# Patient Record
Sex: FEMALE | Race: BLACK OR AFRICAN AMERICAN
[De-identification: names, ages, dates, MRNs, and addresses within clinical notes are randomized per-mention and may not be internally consistent; named-entity substitution may affect disease eponyms.]

---

## 2020-07-01 ENCOUNTER — HOSPITAL ENCOUNTER (OUTPATIENT)
Dept: HOSPITAL 53 - M LAB REF | Age: 30
End: 2020-07-01
Attending: PHYSICIAN ASSISTANT
Payer: COMMERCIAL

## 2020-07-01 DIAGNOSIS — N30.00: Primary | ICD-10-CM

## 2020-12-30 ENCOUNTER — HOSPITAL ENCOUNTER (OUTPATIENT)
Dept: HOSPITAL 53 - M SFHCWAGY | Age: 30
End: 2020-12-30
Attending: NURSE PRACTITIONER
Payer: OTHER GOVERNMENT

## 2020-12-30 DIAGNOSIS — Z11.3: Primary | ICD-10-CM

## 2020-12-30 LAB
CHLAMYDIA DNA AMPLIFICATION: NEGATIVE
N GONORRHOEA RRNA SPEC QL NAA+PROBE: NEGATIVE

## 2020-12-30 PROCEDURE — 87210 SMEAR WET MOUNT SALINE/INK: CPT

## 2020-12-30 PROCEDURE — 81025 URINE PREGNANCY TEST: CPT

## 2020-12-30 PROCEDURE — 87661 TRICHOMONAS VAGINALIS AMPLIF: CPT

## 2021-01-28 ENCOUNTER — HOSPITAL ENCOUNTER (OUTPATIENT)
Dept: HOSPITAL 53 - M PLALAB | Age: 31
End: 2021-01-28
Attending: ADVANCED PRACTICE MIDWIFE
Payer: COMMERCIAL

## 2021-01-28 DIAGNOSIS — Z3A.00: ICD-10-CM

## 2021-01-28 DIAGNOSIS — O10.919: Primary | ICD-10-CM

## 2021-01-28 LAB
ALT SERPL W P-5'-P-CCNC: 15 U/L (ref 12–78)
BILIRUB SERPL-MCNC: 0.4 MG/DL (ref 0.2–1)
CREAT SERPL-MCNC: 0.7 MG/DL (ref 0.55–1.3)
CREAT UR-MCNC: 71.1 MG/DL
GFR SERPL CREATININE-BSD FRML MDRD: > 60 ML/MIN/{1.73_M2} (ref 60–?)
HCT VFR BLD AUTO: 38.3 % (ref 36–47)
HCV AB SER QL: < 0 INDEX (ref ?–0.8)
HGB BLD-MCNC: 12.2 G/DL (ref 12–15.5)
HIV 1+2 AB+HIV1 P24 AG SERPL QL IA: NEGATIVE
LDH SERPL L TO P-CCNC: 155 U/L (ref 84–246)
MCH RBC QN AUTO: 28.2 PG (ref 27–33)
MCHC RBC AUTO-ENTMCNC: 31.9 G/DL (ref 32–36.5)
MCV RBC AUTO: 88.7 FL (ref 80–96)
PLATELET # BLD AUTO: 220 10^3/UL (ref 150–450)
PROT UR-MCNC: 11.8 MG/DL (ref 0–12)
RBC # BLD AUTO: 4.32 10^6/UL (ref 4–5.4)
URATE SERPL-MCNC: 2.4 MG/DL (ref 2.6–6)
WBC # BLD AUTO: 7.4 10^3/UL (ref 4–10)

## 2021-01-29 LAB
CHLAMYDIA DNA AMPLIFICATION: NEGATIVE
N GONORRHOEA RRNA SPEC QL NAA+PROBE: NEGATIVE

## 2021-03-26 ENCOUNTER — HOSPITAL ENCOUNTER (OUTPATIENT)
Dept: HOSPITAL 53 - M SFHCWAGY | Age: 31
End: 2021-03-26
Attending: ADVANCED PRACTICE MIDWIFE
Payer: OTHER GOVERNMENT

## 2021-03-26 DIAGNOSIS — O10.012: Primary | ICD-10-CM

## 2021-03-26 PROCEDURE — 87086 URINE CULTURE/COLONY COUNT: CPT

## 2021-04-15 ENCOUNTER — HOSPITAL ENCOUNTER (OUTPATIENT)
Dept: HOSPITAL 53 - M WHC | Age: 31
End: 2021-04-15
Attending: ADVANCED PRACTICE MIDWIFE
Payer: COMMERCIAL

## 2021-04-15 DIAGNOSIS — Z3A.20: ICD-10-CM

## 2021-04-15 NOTE — REP
INDICATION:

ANATOMY.



COMPARISON:

None.



TECHNIQUE:

Real-time sonographic evaluation of the gravid uterus performed.



FINDINGS:

Estimated gestational age is20 weeks 2 days, EDC 08/31/2021.  Today's measurements

indicate appropriate growth.



Presentation: Variable

Placenta posterior, grade 1, without evidence of placenta previa.

Fetal heart rate is recorded at 157 beats per minute.

Amniotic fluid is subjectively normal.

Closed cervical length is measured at 3.4 cm.



Biometry chart:



BPD: 45 mm, 19 weeks 4 days, 31st percentile.

HC: 172 mm, 19 weeks 5 days, 34th percentile

AC: 150 mm, 20 weeks 1 days, 48th percentile

Femur length: 33 mm, 20 weeks 3 days, 52nd percentile

HC to AC ratio: 1.15, normal range 1.06-1.24.



Estimated fetal weight: 340g, 41st percentile.



Fetal anatomy:



Cranium: Grossly normal

Lateral Ventricles/Choroid Plexus: Grossly normal

Posterior Fossa/Cerebellum: Grossly normal

Nose/lips/profile: Grossly normal

Four chamber heart: Grossly normal

Right ventricular outflow tract: Grossly normal

Left ventricular outflow tract: Grossly normal

Left-sided stomach: Grossly normal

Kidneys: Grossly normal

Bladder: Grossly normal

Cord Insertion: Grossly normal

3 vessel cord: Grossly normal

Spine: Grossly normal



Adjacent to the maternal right ovary is a small cystic structure measuring 2.7 x 1.6 x

1.9 cm.



IMPRESSION:

Viable single intrauterine gestation as above.





<Electronically signed by Samuel Bolivar > 04/15/21 [id number]

## 2021-06-10 ENCOUNTER — HOSPITAL ENCOUNTER (OUTPATIENT)
Dept: HOSPITAL 53 - M PLALAB | Age: 31
End: 2021-06-10
Attending: ADVANCED PRACTICE MIDWIFE
Payer: COMMERCIAL

## 2021-06-10 DIAGNOSIS — O10.012: Primary | ICD-10-CM

## 2021-06-10 LAB
HCT VFR BLD AUTO: 38.5 % (ref 36–47)
HGB BLD-MCNC: 12.4 G/DL (ref 12–15.5)
MCH RBC QN AUTO: 29.6 PG (ref 27–33)
MCHC RBC AUTO-ENTMCNC: 32.2 G/DL (ref 32–36.5)
MCV RBC AUTO: 91.9 FL (ref 80–96)
PLATELET # BLD AUTO: 204 10^3/UL (ref 150–450)
RBC # BLD AUTO: 4.19 10^6/UL (ref 4–5.4)
WBC # BLD AUTO: 6.7 10^3/UL (ref 4–10)

## 2021-07-07 ENCOUNTER — HOSPITAL ENCOUNTER (OUTPATIENT)
Dept: HOSPITAL 53 - M WHC | Age: 31
End: 2021-07-07
Attending: ADVANCED PRACTICE MIDWIFE
Payer: COMMERCIAL

## 2021-07-07 DIAGNOSIS — Z3A.32: ICD-10-CM

## 2021-07-07 DIAGNOSIS — O10.913: Primary | ICD-10-CM

## 2021-07-07 PROCEDURE — 76816 OB US FOLLOW-UP PER FETUS: CPT

## 2021-07-07 PROCEDURE — 59025 FETAL NON-STRESS TEST: CPT

## 2021-07-07 NOTE — REP
INDICATION:

CHRONIC HYPERTENSION,GROWTH



COMPARISON:

04/15/2021



TECHNIQUE:

Transabdominal obstetrical ultrasound with color Doppler evaluation.



FINDINGS:

Examination demonstrates a single live intrauterine pregnancy in cephalic

presentation.  Fetal motion is identified by technologist.  Placenta is noted

posterofundally and  grade 1 without evidence for placenta previa or abruption.

Amniotic fluid volume is subjectively low.  Cervix measures 3.5 cm in length and

appears closed.



Selected gestational age: 32 weeks 1 day with ARIELLE 08/31/2021.

Gestational age by current measurements 32 weeks 6 days with ARIELLE 08/26/2021.



FHR equals 132 beats per minute.



BPD:      8.3 cm at        33 weeks 2 days

HC:         30.0 cm at         33 weeks 1 day

AC:         28.5 cm at      32 weeks 3 days

FL:          6.1 cm at      31 weeks 5 days

HL:          5.8 cm at      33 weeks 2 days

HC/AC: 1.05

Estimated fetal weight 1965 grams (49thpercentile).



SOPHIA: 7.9 cm (8.6-24.2)

Umbilical artery SD ratio: 2.42, 2.60 (1.84-3.86)



IMPRESSION:

1. Single live intrauterine pregnancy in cephalic presentation demonstrating

appropriate interval growth.

2. Amniotic fluid volume is minimally below normal range.





<Electronically signed by Christiano Zepeda > 07/07/21 [id number]

## 2021-07-15 ENCOUNTER — HOSPITAL ENCOUNTER (OUTPATIENT)
Dept: HOSPITAL 53 - M PLALAB | Age: 31
End: 2021-07-15
Attending: ADVANCED PRACTICE MIDWIFE
Payer: COMMERCIAL

## 2021-07-15 DIAGNOSIS — O10.919: Primary | ICD-10-CM

## 2021-07-15 LAB
ALT SERPL W P-5'-P-CCNC: 30 U/L (ref 12–78)
BILIRUB SERPL-MCNC: 0.3 MG/DL (ref 0.2–1)
CREAT SERPL-MCNC: 0.67 MG/DL (ref 0.55–1.3)
CREAT UR-MCNC: 168 MG/DL
GFR SERPL CREATININE-BSD FRML MDRD: > 60 ML/MIN/{1.73_M2} (ref 60–?)
HCT VFR BLD AUTO: 36.4 % (ref 36–47)
HGB BLD-MCNC: 11.8 G/DL (ref 12–15.5)
LDH SERPL L TO P-CCNC: 144 U/L (ref 84–246)
MCH RBC QN AUTO: 29.4 PG (ref 27–33)
MCHC RBC AUTO-ENTMCNC: 32.4 G/DL (ref 32–36.5)
MCV RBC AUTO: 90.8 FL (ref 80–96)
PLATELET # BLD AUTO: 195 10^3/UL (ref 150–450)
PROT UR-MCNC: 24.1 MG/DL (ref 0–12)
RBC # BLD AUTO: 4.01 10^6/UL (ref 4–5.4)
URATE SERPL-MCNC: 3.3 MG/DL (ref 2.6–6)
WBC # BLD AUTO: 6.9 10^3/UL (ref 4–10)

## 2021-07-16 ENCOUNTER — HOSPITAL ENCOUNTER (OUTPATIENT)
Dept: HOSPITAL 53 - M LDO | Age: 31
LOS: 1 days | Discharge: HOME | End: 2021-07-17
Attending: ADVANCED PRACTICE MIDWIFE
Payer: COMMERCIAL

## 2021-07-16 VITALS — SYSTOLIC BLOOD PRESSURE: 133 MMHG | DIASTOLIC BLOOD PRESSURE: 86 MMHG

## 2021-07-16 VITALS — DIASTOLIC BLOOD PRESSURE: 83 MMHG | SYSTOLIC BLOOD PRESSURE: 135 MMHG

## 2021-07-16 VITALS — SYSTOLIC BLOOD PRESSURE: 142 MMHG | DIASTOLIC BLOOD PRESSURE: 91 MMHG

## 2021-07-16 VITALS — SYSTOLIC BLOOD PRESSURE: 131 MMHG | DIASTOLIC BLOOD PRESSURE: 88 MMHG

## 2021-07-16 VITALS — DIASTOLIC BLOOD PRESSURE: 93 MMHG | SYSTOLIC BLOOD PRESSURE: 130 MMHG

## 2021-07-16 VITALS — HEIGHT: 62 IN | WEIGHT: 172.4 LBS | BODY MASS INDEX: 31.73 KG/M2

## 2021-07-16 VITALS — DIASTOLIC BLOOD PRESSURE: 83 MMHG | SYSTOLIC BLOOD PRESSURE: 139 MMHG

## 2021-07-16 VITALS — SYSTOLIC BLOOD PRESSURE: 130 MMHG | DIASTOLIC BLOOD PRESSURE: 93 MMHG

## 2021-07-16 DIAGNOSIS — Z3A.33: ICD-10-CM

## 2021-07-16 DIAGNOSIS — F43.10: ICD-10-CM

## 2021-07-16 DIAGNOSIS — O10.013: Primary | ICD-10-CM

## 2021-07-16 DIAGNOSIS — O99.343: ICD-10-CM

## 2021-07-16 DIAGNOSIS — F41.9: ICD-10-CM

## 2021-07-16 LAB
ALT SERPL W P-5'-P-CCNC: 25 U/L (ref 12–78)
APPEARANCE UR: (no result)
BACTERIA UR QL AUTO: NEGATIVE
BILIRUB SERPL-MCNC: 0.2 MG/DL (ref 0.2–1)
BILIRUB UR QL STRIP.AUTO: NEGATIVE
CREAT SERPL-MCNC: 0.62 MG/DL (ref 0.55–1.3)
GFR SERPL CREATININE-BSD FRML MDRD: > 60 ML/MIN/{1.73_M2} (ref 60–?)
GLUCOSE UR QL STRIP.AUTO: NEGATIVE MG/DL
HCT VFR BLD AUTO: 32.8 % (ref 36–47)
HGB BLD-MCNC: 11 G/DL (ref 12–15.5)
HGB UR QL STRIP.AUTO: (no result)
KETONES UR QL STRIP.AUTO: NEGATIVE MG/DL
LDH SERPL L TO P-CCNC: 135 U/L (ref 84–246)
LEUKOCYTE ESTERASE UR QL STRIP.AUTO: NEGATIVE
MCH RBC QN AUTO: 30.1 PG (ref 27–33)
MCHC RBC AUTO-ENTMCNC: 33.5 G/DL (ref 32–36.5)
MCV RBC AUTO: 89.6 FL (ref 80–96)
MUCOUS THREADS URNS QL MICRO: (no result)
NITRITE UR QL STRIP.AUTO: NEGATIVE
PH UR STRIP.AUTO: 6 UNITS (ref 5–9)
PLATELET # BLD AUTO: 177 10^3/UL (ref 150–450)
PROT UR QL STRIP.AUTO: NEGATIVE MG/DL
RBC # BLD AUTO: 3.66 10^6/UL (ref 4–5.4)
RBC # UR AUTO: 2 /HPF (ref 0–3)
SP GR UR STRIP.AUTO: 1.01 (ref 1–1.03)
SQUAMOUS #/AREA URNS AUTO: 2 /HPF (ref 0–6)
URATE SERPL-MCNC: 3 MG/DL (ref 2.6–6)
UROBILINOGEN UR QL STRIP.AUTO: 0.2 MG/DL (ref 0–2)
WBC # BLD AUTO: 6.6 10^3/UL (ref 4–10)
WBC #/AREA URNS AUTO: 1 /HPF (ref 0–3)

## 2021-07-16 PROCEDURE — 82247 BILIRUBIN TOTAL: CPT

## 2021-07-16 PROCEDURE — 59025 FETAL NON-STRESS TEST: CPT

## 2021-07-16 PROCEDURE — 83615 LACTATE (LD) (LDH) ENZYME: CPT

## 2021-07-16 PROCEDURE — 76820 UMBILICAL ARTERY ECHO: CPT

## 2021-07-16 PROCEDURE — 76819 FETAL BIOPHYS PROFIL W/O NST: CPT

## 2021-07-16 PROCEDURE — 84460 ALANINE AMINO (ALT) (SGPT): CPT

## 2021-07-16 PROCEDURE — 84550 ASSAY OF BLOOD/URIC ACID: CPT

## 2021-07-16 PROCEDURE — 82565 ASSAY OF CREATININE: CPT

## 2021-07-16 PROCEDURE — 36415 COLL VENOUS BLD VENIPUNCTURE: CPT

## 2021-07-16 PROCEDURE — 84450 TRANSFERASE (AST) (SGOT): CPT

## 2021-07-16 PROCEDURE — 84156 ASSAY OF PROTEIN URINE: CPT

## 2021-07-16 PROCEDURE — 80053 COMPREHEN METABOLIC PANEL: CPT

## 2021-07-16 PROCEDURE — 85027 COMPLETE CBC AUTOMATED: CPT

## 2021-07-16 PROCEDURE — 82570 ASSAY OF URINE CREATININE: CPT

## 2021-07-16 PROCEDURE — 81001 URINALYSIS AUTO W/SCOPE: CPT

## 2021-07-17 VITALS — SYSTOLIC BLOOD PRESSURE: 123 MMHG | DIASTOLIC BLOOD PRESSURE: 74 MMHG

## 2021-07-17 VITALS — DIASTOLIC BLOOD PRESSURE: 93 MMHG | SYSTOLIC BLOOD PRESSURE: 149 MMHG

## 2021-07-17 VITALS — DIASTOLIC BLOOD PRESSURE: 88 MMHG | SYSTOLIC BLOOD PRESSURE: 141 MMHG

## 2021-07-17 VITALS — DIASTOLIC BLOOD PRESSURE: 89 MMHG | SYSTOLIC BLOOD PRESSURE: 136 MMHG

## 2021-07-17 VITALS — DIASTOLIC BLOOD PRESSURE: 77 MMHG | SYSTOLIC BLOOD PRESSURE: 130 MMHG

## 2021-07-17 VITALS — SYSTOLIC BLOOD PRESSURE: 129 MMHG | DIASTOLIC BLOOD PRESSURE: 81 MMHG

## 2021-07-17 LAB
ALBUMIN SERPL BCG-MCNC: 2.5 GM/DL (ref 3.2–5.2)
BUN SERPL-MCNC: 11 MG/DL (ref 7–18)
CALCIUM SERPL-MCNC: 8.1 MG/DL (ref 8.5–10.1)
CHLORIDE SERPL-SCNC: 110 MEQ/L (ref 98–107)
CO2 SERPL-SCNC: 23 MEQ/L (ref 21–32)
CREAT UR-MCNC: 72.7 MG/DL
GLUCOSE SERPL-MCNC: 112 MG/DL (ref 70–100)
POTASSIUM SERPL-SCNC: 3.7 MEQ/L (ref 3.5–5.1)
PROT SERPL-MCNC: 5.7 GM/DL (ref 6.4–8.2)
PROT UR-MCNC: 9 MG/DL (ref 0–12)
SODIUM SERPL-SCNC: 140 MEQ/L (ref 136–145)

## 2021-07-17 NOTE — IPNPDOC
Text Note


Date of Service


The patient was seen on 7/17/21.





NOTE


Outpatient





Feels mild improvement in symptoms since labetalol 100mg and buspar


Cat I tracing continues


BP within normal range


Desires discharge





Rx zoloft 25mg and buspar 5mg BID sent to her pharmacy


Warnings reviewed. Keep appt next week





VS,Fishbone, I+O


VS, Fishbone, I+O


Laboratory Tests


7/16/21 23:00











Vital Signs








  Date Time  Temp Pulse Resp B/P (MAP) Pulse Ox O2 Delivery O2 Flow Rate FiO2


 


7/17/21 01:16  75  123/74 (90)    


 


7/16/21 22:43 98.2  18     

















Lidia Turner CNM  Jul 17, 2021 01:45

## 2021-07-17 NOTE — REPVR
PROCEDURE INFORMATION: 

Exam: US Fetal Biophysical Profile Without Non-Stress Test 

Exam date and time: 2021 11:45 PM 

Age: 30 years old 

Clinical indication: HTN; Pregnant 



TECHNIQUE: 

Imaging protocol: US biophysical profile without non-stress testing. 



COMPARISON: 

US OBS FOLLOW UP OR REPEAT 2021 1:45 PM 



FINDINGS: 

Fetal heart rate: 142 bpm 

Presentation: Cephalic 

Placenta: Grade 1. Fundal positioning. No placenta previa or placental 

abruption is identified. 

Amniotic fluid index: 14.8 cm, which is within normal limits. 



BIOPHYSICAL PROFILE: 

Fetal Breathin/2 

Gross fetal body movements: 2/2 

Fetal tone: 2/2 

Qualitative amniotic fluid: 2/2 

Biophysical Profile Score: 8/8 



DOPPLER: 

Umbilical artery Doppler: PSV = 35.7 cm/s. EDV = 14.6 cm/s. S/D ratio = 2.45, 

which is within normal limits. RI = 0.59, which is within normal limits. There 

is normal continuous forward flow in the umbilical artery during diastole and 

no absent or reversed end-diastolic flow is noted. 



MATERNAL ANATOMY: 

Cervix: The cervix is closed and measures 4.1 cm in length. 



Other findings: Limited imaging of the fetal urinary bladder, kidneys, and 

stomach is unremarkable. 



IMPRESSION: 

Biophysical profile score is 8 out of 8. 



Electronically signed by: Akshat Van On 2021  00:36:57 AM

## 2021-07-17 NOTE — IPNPDOC
Text Note


Date of Service


The patient was seen on 21.





NOTE


Outpatient





31yo  ARIELLE 2021. Presents @33w3d with reports of elevated blood 

pressure at home, headache, shaking and heart palpitations. Denies LOF, bleeding

or UC. Hx significant for CHTN but hasn't required labetalol up till now. In 

addition, hx PTSD, anxiety, depression, no medications since age 18. 





No apparent distress


Mild BP elevations.


Cat I fetal tracing


Rare UC





Start labetalol 100mg daily tonight. PreE panel, urine ratio, CBC essentially 

unchanged from earlier this week. CMP wnl. 


BPP /8, SOPHIA 14.8


Pt agrees to try zoloft and buspar at this time.





VS,Fishbone, I+O


VS, Fishbone, I+O


Laboratory Tests


21 23:00











Vital Signs








  Date Time  Temp Pulse Resp B/P (MAP) Pulse Ox O2 Delivery O2 Flow Rate FiO2


 


21 23:41  74  130/93    


 


21 22:43 98.2  18     

















Lidia Turner CNM  2021 01:01

## 2021-07-26 ENCOUNTER — HOSPITAL ENCOUNTER (OUTPATIENT)
Dept: HOSPITAL 53 - M WHC | Age: 31
End: 2021-07-26
Attending: ADVANCED PRACTICE MIDWIFE
Payer: COMMERCIAL

## 2021-07-26 DIAGNOSIS — Z3A.35: ICD-10-CM

## 2021-07-26 DIAGNOSIS — O10.919: Primary | ICD-10-CM

## 2021-07-26 NOTE — REP
INDICATION:

GROWTH.



COMPARISON:

Comparison study July 16, 2021..



TECHNIQUE:

Transabdominal obstetric sonography.



FINDINGS:

Scanning through the gravid uterus demonstrates a viable single intrauterine gestation

in cephalic fetal lie.  Fetal motion is observed and fetal heart rate is recorded at

142 beats per minute.  A fundal placenta is seen, grade 1, without evidence of

placenta previa. Closed cervical length is not seen due to fetal head position.  No

extrauterine abnormality is observed.  Amniotic fluid is subjectively normal, SOPHIA

normal 13.4 cm..



.



Biometry chart:

BPD 9.0 cm, 36 weeks 2 days

Head circumference 31.1 cm, 34 weeks 5 days

Abdominal circumference 30.8 cm, 34 weeks 5 days

Femur length 6.7 cm, 34 weeks 5 days

Humeral length 6.1 cm, 35 weeks 1 day

HC AC ratio normal 1.01

Cephalic index normal 0.83

Estimated fetal weight 2540 g, 5 lb 9 oz, 46 percentile for 34 weeks 6 days



IMPRESSION:

Viable single intrauterine gestation at 35 weeks 1 days by today's composite

sonographic criteria.  ARIELLE by today's sonography August 29, 2021.  No complication

identified.



Expected gestational age estimate based on known ARIELLE of 31 August 2021 is 34 weeks 6

days appropriate interval growth.





<Electronically signed by Marito Acevedo > 07/26/21 [id number]

## 2021-07-29 ENCOUNTER — HOSPITAL ENCOUNTER (OUTPATIENT)
Dept: HOSPITAL 53 - M SFHCWAGY | Age: 31
End: 2021-07-29
Attending: ADVANCED PRACTICE MIDWIFE
Payer: COMMERCIAL

## 2021-07-29 DIAGNOSIS — Z34.83: Primary | ICD-10-CM

## 2021-07-29 DIAGNOSIS — Z3A.35: ICD-10-CM

## 2021-07-29 PROCEDURE — 59025 FETAL NON-STRESS TEST: CPT

## 2021-07-29 PROCEDURE — 87081 CULTURE SCREEN ONLY: CPT

## 2021-08-11 ENCOUNTER — HOSPITAL ENCOUNTER (OUTPATIENT)
Dept: HOSPITAL 53 - M LDO | Age: 31
Discharge: HOME | End: 2021-08-11
Attending: ADVANCED PRACTICE MIDWIFE
Payer: COMMERCIAL

## 2021-08-11 VITALS — DIASTOLIC BLOOD PRESSURE: 68 MMHG | SYSTOLIC BLOOD PRESSURE: 124 MMHG

## 2021-08-11 VITALS — SYSTOLIC BLOOD PRESSURE: 104 MMHG | DIASTOLIC BLOOD PRESSURE: 71 MMHG

## 2021-08-11 VITALS — SYSTOLIC BLOOD PRESSURE: 114 MMHG | DIASTOLIC BLOOD PRESSURE: 70 MMHG

## 2021-08-11 VITALS — DIASTOLIC BLOOD PRESSURE: 60 MMHG | SYSTOLIC BLOOD PRESSURE: 99 MMHG

## 2021-08-11 VITALS — HEIGHT: 62 IN | WEIGHT: 161.16 LBS | BODY MASS INDEX: 29.66 KG/M2

## 2021-08-11 VITALS — SYSTOLIC BLOOD PRESSURE: 116 MMHG | DIASTOLIC BLOOD PRESSURE: 73 MMHG

## 2021-08-11 DIAGNOSIS — O10.013: ICD-10-CM

## 2021-08-11 DIAGNOSIS — O36.8190: Primary | ICD-10-CM

## 2021-08-11 DIAGNOSIS — Z87.891: ICD-10-CM

## 2021-08-11 DIAGNOSIS — Z88.1: ICD-10-CM

## 2021-08-11 DIAGNOSIS — Z3A.37: ICD-10-CM

## 2021-08-11 DIAGNOSIS — Z91.040: ICD-10-CM

## 2021-08-11 DIAGNOSIS — Z88.6: ICD-10-CM

## 2021-08-11 LAB
APPEARANCE UR: (no result)
BACTERIA UR QL AUTO: (no result)
BILIRUB UR QL STRIP.AUTO: NEGATIVE
GLUCOSE UR QL STRIP.AUTO: NEGATIVE MG/DL
HGB UR QL STRIP.AUTO: NEGATIVE
KETONES UR QL STRIP.AUTO: NEGATIVE MG/DL
LEUKOCYTE ESTERASE UR QL STRIP.AUTO: (no result)
MUCOUS THREADS URNS QL MICRO: (no result)
NITRITE UR QL STRIP.AUTO: NEGATIVE
PH UR STRIP.AUTO: 7 UNITS (ref 5–9)
PROT UR QL STRIP.AUTO: NEGATIVE MG/DL
RBC # UR AUTO: 4 /HPF (ref 0–3)
SP GR UR STRIP.AUTO: 1.02 (ref 1–1.03)
SQUAMOUS #/AREA URNS AUTO: 6 /HPF (ref 0–6)
UROBILINOGEN UR QL STRIP.AUTO: 2 MG/DL (ref 0–2)
WBC #/AREA URNS AUTO: 2 /HPF (ref 0–3)

## 2021-08-11 PROCEDURE — 59025 FETAL NON-STRESS TEST: CPT

## 2021-08-11 PROCEDURE — 81001 URINALYSIS AUTO W/SCOPE: CPT

## 2021-08-11 NOTE — IPN
PROGRESS NOTE



DATE:  2021



SUBJECTIVE: Keyana is a 30-year-old  7 para 5-1-0-5 now at 37 and 1/7th

weeks gestation, EDC of 2021 based on last menstrual period and confirmed

by first trimester ultrasound. She presents to Labor and Delivery with a report

of decreased fetal movement as well as feeling shaky and lightheaded

approximately at 12:15 p.m. She denies vaginal bleeding, leakage of fluid,

fetus has been active, she denies regular painful contractions. Her prenatal

care was initiated at [facility] in the first trimester.

Prenatal course complicated by history of postpartum hemorrhage, chronic

hypertension with labile blood pressures where she did not start Labetalol

until late in the pregnancy with a pressure mildly elevated in the 140s/90s.

She has a history of depression, anxiety and PTSD and borderline

oligohydramnios in July that has since resolved with the most recent SOPHIA of

13.4 cm. 



OBSTETRIC LABS: O positive, antibody screen negative, gonorrhea and chlamydia

negative, hepatitis B negative, hepatitis C negative, HIV negative, syphilis

negative. Urine culture: No growth. Gestational diabetic screening 81. GBS is

negative. On  she underwent repeat preeclamptic labs with all normal

results. Hemoglobin of 11.0, 32.1 hematocrit, platelets 177,000 and spot urine

of 0.14 which is decreased from her baseline labs of spot urine of 0.17. On

, she had a fetal growth ultrasound with a cephalic presentation, 2540

grams, 5 pounds, 9 ounces, 46% percentile and 13.4 cm for fluid. 



OBSTETRIC HISTORY: 2009: 37 weeks, 7 pound, 15 ounce female; 2011,

42 weeks, 7 pound, 3 ounce male, vaginal delivery; 2012, 40 weeks, 7

pound, 1 ounce male, vaginal delivery; 2015, 40 weeks, 7 pound, 3 ounce

female, vaginal delivery, 2015, vaginal delivery 22 weeks, twin

gestation, fetal demise x2; 2017, 39 weeks, 7 pound, 15 ounce female,

vaginal delivery, postpartum hemorrhage.



PAST MEDICAL HISTORY:  Chronic hypertension, menorrhagia, chronic cystitis,

nasal congestion, depression, knee pain, neutropenia, anemia, cardiac murmur,

arthralgia, anxiety, PTSD. 



PAST SURGICAL HISTORY: None.



FAMILY HISTORY:  Hypertension, depression, anxiety, PTSD, high cholesterol,

stomach cancer, lung cancer, diabetes.



SOCIAL HISTORY:  She is a former smoker. Denies alcohol and drug use. She does

have a history of chlamydia. Denies history of abuse currently. She is single.

There does not appear to be a father of the baby involved. 



ALLERGIES: Diflucan, aspirin, Bactrim, Macrobid and latex gloves. 



CURRENT MEDICATIONS: 

  1.  Labetalol 100 mg p.o. b.i.d.

  2.  Prenatal vitamin.



OBJECTIVE: Upon arrival, temperature is 98.6, pulse is 86, blood pressure

99/60, 104/71, 114/70, 116/73 and 124/68. Fetal heart rate is 135 with moderate

variability, positive accelerations, negative decelerations. Pattern of

contractions: None. Sterile vaginal exam: Deferred. Her UA with a specific

gravity of 1.016, negative protein, negative ketones, trace leukocytes, 4 RBCs,

and 1+ bacteria. Bedside glucose 128.



ASSESSMENT: Intrauterine pregnancy at 37 and 1/7th weeks. Fetal heart rate is

Category 1. Reassuring fetal status, labile blood pressure.



PLAN: Per consult with Dr. Jerica Lam. The patient is to discontinue her

Labetalol at this time. She is scheduled for induction of labor in six days.

There is a cardiology referral pending due to the frequent episodes of

dizziness and feeling lightheaded with palpitations. She does not have chest

pain. I did review signs and symptoms of labor, fetal movement counts and

danger signs. I reviewed access to care. The patient and her aunt had all of

their questions answered and are agreeable to discharge home.

## 2021-08-17 ENCOUNTER — HOSPITAL ENCOUNTER (INPATIENT)
Dept: HOSPITAL 53 - M LDI | Age: 31
LOS: 2 days | Discharge: HOME | End: 2021-08-19
Attending: OBSTETRICS & GYNECOLOGY | Admitting: OBSTETRICS & GYNECOLOGY
Payer: COMMERCIAL

## 2021-08-17 VITALS — DIASTOLIC BLOOD PRESSURE: 95 MMHG | SYSTOLIC BLOOD PRESSURE: 158 MMHG

## 2021-08-17 VITALS — HEIGHT: 62 IN | BODY MASS INDEX: 29.21 KG/M2 | WEIGHT: 158.73 LBS

## 2021-08-17 VITALS — DIASTOLIC BLOOD PRESSURE: 95 MMHG | SYSTOLIC BLOOD PRESSURE: 144 MMHG

## 2021-08-17 VITALS — DIASTOLIC BLOOD PRESSURE: 95 MMHG | SYSTOLIC BLOOD PRESSURE: 140 MMHG

## 2021-08-17 VITALS — SYSTOLIC BLOOD PRESSURE: 160 MMHG | DIASTOLIC BLOOD PRESSURE: 99 MMHG

## 2021-08-17 VITALS — SYSTOLIC BLOOD PRESSURE: 134 MMHG | DIASTOLIC BLOOD PRESSURE: 84 MMHG

## 2021-08-17 VITALS — SYSTOLIC BLOOD PRESSURE: 121 MMHG | DIASTOLIC BLOOD PRESSURE: 72 MMHG

## 2021-08-17 VITALS — DIASTOLIC BLOOD PRESSURE: 82 MMHG | SYSTOLIC BLOOD PRESSURE: 133 MMHG

## 2021-08-17 DIAGNOSIS — Z3A.38: ICD-10-CM

## 2021-08-17 LAB
HCT VFR BLD AUTO: 35.1 % (ref 36–47)
HGB BLD-MCNC: 12 G/DL (ref 12–15.5)
MCH RBC QN AUTO: 30.3 PG (ref 27–33)
MCHC RBC AUTO-ENTMCNC: 34.2 G/DL (ref 32–36.5)
MCV RBC AUTO: 88.6 FL (ref 80–96)
PLATELET # BLD AUTO: 192 10^3/UL (ref 150–450)
RBC # BLD AUTO: 3.96 10^6/UL (ref 4–5.4)
WBC # BLD AUTO: 5.8 10^3/UL (ref 4–10)

## 2021-08-17 PROCEDURE — 3E0P7GC INTRODUCTION OF OTHER THERAPEUTIC SUBSTANCE INTO FEMALE REPRODUCTIVE, VIA NATURAL OR ARTIFICIAL OPENING: ICD-10-PCS | Performed by: OBSTETRICS & GYNECOLOGY

## 2021-08-17 RX ADMIN — MISOPROSTOL SCH MCG: 100 TABLET ORAL at 20:00

## 2021-08-17 NOTE — HPEPDOC
Obstetrical History & Physical


General


Date of Admission


Aug 17, 2021 at 18:12





History of Present Illness


31 yo  female at 38 0/7 weeks by LMP c/w 9 week ultrasound 

(EDC=2021) presents for labor induction. The indication for delivery <39 

weeks is chronic hypertension requiring anti-hypertensive medication. No contrac

tions. no bleeding.


Information Provided By:  Patient


Age:  30


:  7


Term:  5


Pre-term:  1


Abortions:  0


Livin





Prenatal Care


Prenatal Care:  [facility]





Prenatal Dating


Final EDC:  Aug 31, 2021


Final EDC for Daily Update:  Aug 17, 2021


Final EDC by:  LMP, 1st trimester (US)





Antepartum Course


Prenatal Diagnos(e)s


chronic hypertension: Labetalol 100 mg BID during pregnancy (Lisinopril prior to

pregnancy)





Past Medical History


Past Obstetrical History :  


   Past Obstetrical History:  Multigravida


Past Medical History


Medical History


med hx:1. chronic hypewrtension


           2. h/o chlamydia








OB hx: 


1   10/02/2009   37      7   15   F      epidural   [place]         


2   2011   42      7   3   M      epidural   [facility]      

   


3   2012   40      7   1   M      epidural   [place]         


4   2015   40      7   3   F      epidural   [place]      [facility]   


5   10/10/2015                  Vaginal            Twin gestation; Stillbirth 

over 20 weeks [place]       [facility]


6   11/10/2017   39      7   15   F      epidural   [place]      [facility]


Surgical History:  Denies/None





Family History


Significant Family History:  No pertinent family hx





Social History


Marital Status:  Single


Psychosocial History:  No pertinent psych hx


* Smoker:  non-smoker


Drugs:  denies





Allergies


Coded Allergies:  


     aspirin (Verified  Allergy, Intermediate, SWELLING, 21)


     fluconazole (Verified  Allergy, Unknown, 21)


     latex (Verified  Allergy, Unknown, 21)


     sulfamethoxazole (Verified  Allergy, Unknown, 21)


     trimethoprim (Verified  Allergy, Unknown, 21)





Medications


Scheduled


Calcium Carbonate (Tums) 200 Mg Tab.chew, 2 TAB PO Q4H for cough and congestion


Prenatal No.137/Iron/Folic Acd (Prenatal Vitamin Tablet) 1 Each Tablet, 1 TAB PO

DAILY


Sertraline Hcl (Zoloft) 25 Mg Tablet, 0.5 TAB PO DAILY


   1/2 tablet daily for one week. Then increase to 1 tablet daily. 





Physical Examination


Physical Examination


GENERAL: Alert and oriented times three.


BREAST: .


ABDOMEN: Gravid and non-tender to touch.


FETUS: Is vertex (VTX) by sterile vaginal examination (SVE), fetus is vertex 

(VTX) by Leopold.


HEART RATE: Regular rate and rhythm.


LUNGS: Clear to auscultation (CTA).


EXTREMITIES: No edema. No clonus. Deep tendon reflexes (DTRs) + .





Laboratory Data


24H LABS


Laboratory Tests 2


21 18:24: Serology Scanned Report Hepatitis B Testing


21 18:53: 


Nucleated Red Blood Cells % (auto) 0.0, Syphilis Serology NONREACTIVE


CBC/BMP


Laboratory Tests


21 18:53











Pertinent Laboratoy Data


Blood Type:  O+


Group B Streptococcus:  Negative





Vaginal Examination


Dilation:  2cm


Effacement:  50%


Station:  -2


Cervical Consistency:  Medium


Cervical Position:  Posterior


Fetal Presentation:  Cephalic presentation





Fetal Assessment


Variability:  Moderate


Accelerations:  Positive


Decelerations:  None





Tocometer


Frequency:  irregular





Assessment/Plan


Assessment


Pt is a 30-year-old  (G)7 para (P)[id number] at 38+0 weeks by LMP c/w 9-week 

ultrasound presents to Labor and Delivery for induction due to chronic 

hypertension.





Plan


Admit and orient.


 and consent.


Diet: reg.


Group B Streptococcus (GBS) negative.


Labs and intravenous (IV) per unit protocol.


Anticipate normal spontaneous delivery ().


Plan to use Misoprostol to initiate induction


C-S as appropriate.











ROSMERY PARKER MD             Aug 17, 2021 20:17

## 2021-08-18 VITALS — DIASTOLIC BLOOD PRESSURE: 99 MMHG | SYSTOLIC BLOOD PRESSURE: 162 MMHG

## 2021-08-18 VITALS — DIASTOLIC BLOOD PRESSURE: 79 MMHG | SYSTOLIC BLOOD PRESSURE: 141 MMHG

## 2021-08-18 VITALS — DIASTOLIC BLOOD PRESSURE: 100 MMHG | SYSTOLIC BLOOD PRESSURE: 164 MMHG

## 2021-08-18 VITALS — SYSTOLIC BLOOD PRESSURE: 163 MMHG | DIASTOLIC BLOOD PRESSURE: 93 MMHG

## 2021-08-18 VITALS — DIASTOLIC BLOOD PRESSURE: 86 MMHG | SYSTOLIC BLOOD PRESSURE: 139 MMHG

## 2021-08-18 VITALS — DIASTOLIC BLOOD PRESSURE: 99 MMHG | SYSTOLIC BLOOD PRESSURE: 176 MMHG

## 2021-08-18 VITALS — SYSTOLIC BLOOD PRESSURE: 149 MMHG | DIASTOLIC BLOOD PRESSURE: 88 MMHG

## 2021-08-18 VITALS — SYSTOLIC BLOOD PRESSURE: 154 MMHG | DIASTOLIC BLOOD PRESSURE: 97 MMHG

## 2021-08-18 VITALS — DIASTOLIC BLOOD PRESSURE: 89 MMHG | SYSTOLIC BLOOD PRESSURE: 146 MMHG

## 2021-08-18 VITALS — DIASTOLIC BLOOD PRESSURE: 89 MMHG | SYSTOLIC BLOOD PRESSURE: 133 MMHG

## 2021-08-18 VITALS — SYSTOLIC BLOOD PRESSURE: 181 MMHG | DIASTOLIC BLOOD PRESSURE: 104 MMHG

## 2021-08-18 VITALS — SYSTOLIC BLOOD PRESSURE: 117 MMHG | DIASTOLIC BLOOD PRESSURE: 71 MMHG

## 2021-08-18 VITALS — SYSTOLIC BLOOD PRESSURE: 145 MMHG | DIASTOLIC BLOOD PRESSURE: 86 MMHG

## 2021-08-18 VITALS — DIASTOLIC BLOOD PRESSURE: 100 MMHG | SYSTOLIC BLOOD PRESSURE: 170 MMHG

## 2021-08-18 VITALS — SYSTOLIC BLOOD PRESSURE: 139 MMHG | DIASTOLIC BLOOD PRESSURE: 93 MMHG

## 2021-08-18 VITALS — SYSTOLIC BLOOD PRESSURE: 141 MMHG | DIASTOLIC BLOOD PRESSURE: 90 MMHG

## 2021-08-18 VITALS — SYSTOLIC BLOOD PRESSURE: 167 MMHG | DIASTOLIC BLOOD PRESSURE: 80 MMHG

## 2021-08-18 VITALS — DIASTOLIC BLOOD PRESSURE: 114 MMHG | SYSTOLIC BLOOD PRESSURE: 196 MMHG

## 2021-08-18 VITALS — DIASTOLIC BLOOD PRESSURE: 93 MMHG | SYSTOLIC BLOOD PRESSURE: 146 MMHG

## 2021-08-18 VITALS — SYSTOLIC BLOOD PRESSURE: 157 MMHG | DIASTOLIC BLOOD PRESSURE: 83 MMHG

## 2021-08-18 VITALS — DIASTOLIC BLOOD PRESSURE: 84 MMHG | SYSTOLIC BLOOD PRESSURE: 134 MMHG

## 2021-08-18 VITALS — DIASTOLIC BLOOD PRESSURE: 95 MMHG | SYSTOLIC BLOOD PRESSURE: 160 MMHG

## 2021-08-18 VITALS — SYSTOLIC BLOOD PRESSURE: 158 MMHG | DIASTOLIC BLOOD PRESSURE: 93 MMHG

## 2021-08-18 VITALS — SYSTOLIC BLOOD PRESSURE: 139 MMHG | DIASTOLIC BLOOD PRESSURE: 84 MMHG

## 2021-08-18 VITALS — DIASTOLIC BLOOD PRESSURE: 82 MMHG | SYSTOLIC BLOOD PRESSURE: 124 MMHG

## 2021-08-18 VITALS — SYSTOLIC BLOOD PRESSURE: 133 MMHG | DIASTOLIC BLOOD PRESSURE: 85 MMHG

## 2021-08-18 VITALS — DIASTOLIC BLOOD PRESSURE: 77 MMHG | SYSTOLIC BLOOD PRESSURE: 132 MMHG

## 2021-08-18 VITALS — DIASTOLIC BLOOD PRESSURE: 87 MMHG | SYSTOLIC BLOOD PRESSURE: 134 MMHG

## 2021-08-18 VITALS — DIASTOLIC BLOOD PRESSURE: 91 MMHG | SYSTOLIC BLOOD PRESSURE: 140 MMHG

## 2021-08-18 VITALS — SYSTOLIC BLOOD PRESSURE: 137 MMHG | DIASTOLIC BLOOD PRESSURE: 87 MMHG

## 2021-08-18 PROCEDURE — 10907ZC DRAINAGE OF AMNIOTIC FLUID, THERAPEUTIC FROM PRODUCTS OF CONCEPTION, VIA NATURAL OR ARTIFICIAL OPENING: ICD-10-PCS | Performed by: OBSTETRICS & GYNECOLOGY

## 2021-08-18 RX ADMIN — MISOPROSTOL SCH MCG: 100 TABLET ORAL at 00:23

## 2021-08-18 RX ADMIN — LABETALOL HCL SCH MG: 200 TABLET, FILM COATED ORAL at 20:55

## 2021-08-18 RX ADMIN — LABETALOL HCL SCH MG: 200 TABLET, FILM COATED ORAL at 08:45

## 2021-08-18 RX ADMIN — Medication SCH TAB: at 08:45

## 2021-08-18 NOTE — IPNPDOC
Obstetrical Progress Note


Date of Service


Aug 18, 2021





Subjective


c/o painful contractions. She might be leaking fluid per vagina.





Objective





Vital Signs








  Date Time  Temp Pulse Resp B/P (MAP) Pulse Ox O2 Delivery O2 Flow Rate FiO2


 


8/18/21 01:08  55  176/99 (124)    


 


8/17/21 18:35 97.9  16     











Fetal Assessment


Variability:  Moderate


Accelerations:  Positive


Decelerations:  None


Fetal Heart Rate Tracing:  Category I





Tocometer


Contractions:  Yes


Frequency:  regular


Strength:  palpated as strong





Sterile Vaginal Examination


Dilation:  4 cm


Effacement (%):  80%


Station:  -2


Cervical Consistency:  Soft


Cervical Position:  Posterior


Fetal Postion/Presentation:  Cephalic presentation





Assessment and Plan


Additional Comments


31 yo at 38 weeks with chronic hypertension, induction





Give IV Labetalol 20 mg x 1 stat for severe range BP that has persisted


Pt has received 2 doses of Misoprostol; no further doses needed at this time











ROSMERY PARKER MD             Aug 18, 2021 03:21

## 2021-08-18 NOTE — DNPDOC
[facility] Delivery Note


Delivery Note


DATE OF DELIVERY: 2021 





PREDELIVERY DIAGNOSIS: 38-1/7 weeks' gestation, chronic hypertension, induction 

of labor. 





POST DELIVERY DIAGNOSIS: Delivered.





PROCEDURE: Spontaneous vaginal delivery.





OBSTETRICIAN: Dr. Rosmery Parker MD





ANESTHESIA: epidural.





ESTIMATED BLOOD LOSS: 500 mL.





FINDINGS: 7 pound 10 ounce female infant, Apgar Score 8/9, nuchal cord times x 

1.





DELIVERY SUMMARY: Patient is a 30-year-old  7 now para 6 who was admitted

to labor and delivery for labor induction due to chronic hypertension. She 

received two doses of Misoprostol. She required one dose of IV Labetalol during 

labor to help control blood pressure. She received an epidural, before becoming 

completely dilated shortly thereafter. AROM performed. It took one contraction 

to deliver a 7 lb. 10 oz female infant. Tight nuchal cord x 1 delivered through.

IV Pitocin administered immediately after delivery. No vaginal lacerations 

present. Placenta delivered spontaneously and appeared intact. She had a gush of

blood shortly after delivery of the placenta. She has a history of post partum 

hemorrhage in 3 prior pregnancies. Pt administered Hemabate 0.25 mg IM x 1. She 

also received Cytotec 800 mcg's [place] x 1. Sponge counts correct.











ROSMERY PARKER MD             Aug 18, 2021 05:52

## 2021-08-19 VITALS — SYSTOLIC BLOOD PRESSURE: 129 MMHG | DIASTOLIC BLOOD PRESSURE: 76 MMHG

## 2021-08-19 RX ADMIN — LABETALOL HCL SCH MG: 200 TABLET, FILM COATED ORAL at 08:49

## 2021-08-19 RX ADMIN — Medication SCH TAB: at 08:47

## 2023-05-24 ENCOUNTER — HOSPITAL ENCOUNTER (OUTPATIENT)
Dept: HOSPITAL 53 - M LAB REF | Age: 33
End: 2023-05-24
Attending: PHYSICIAN ASSISTANT
Payer: COMMERCIAL

## 2023-05-24 DIAGNOSIS — J02.9: Primary | ICD-10-CM

## 2023-09-18 ENCOUNTER — HOSPITAL ENCOUNTER (OUTPATIENT)
Dept: HOSPITAL 53 - M LAB REF | Age: 33
End: 2023-09-18
Attending: NURSE PRACTITIONER
Payer: COMMERCIAL

## 2023-09-18 DIAGNOSIS — Z12.4: Primary | ICD-10-CM

## 2024-01-30 ENCOUNTER — HOSPITAL ENCOUNTER (EMERGENCY)
Dept: HOSPITAL 53 - M ED | Age: 34
Discharge: HOME | End: 2024-01-30
Payer: COMMERCIAL

## 2024-01-30 VITALS — TEMPERATURE: 97.4 F

## 2024-01-30 VITALS — WEIGHT: 155.65 LBS | HEIGHT: 64 IN | BODY MASS INDEX: 26.57 KG/M2

## 2024-01-30 VITALS — OXYGEN SATURATION: 100 % | DIASTOLIC BLOOD PRESSURE: 94 MMHG | SYSTOLIC BLOOD PRESSURE: 138 MMHG

## 2024-01-30 DIAGNOSIS — Z88.6: ICD-10-CM

## 2024-01-30 DIAGNOSIS — Z88.2: ICD-10-CM

## 2024-01-30 DIAGNOSIS — Z91.040: ICD-10-CM

## 2024-01-30 DIAGNOSIS — Z79.899: ICD-10-CM

## 2024-01-30 DIAGNOSIS — I10: ICD-10-CM

## 2024-01-30 DIAGNOSIS — R06.00: Primary | ICD-10-CM

## 2024-01-30 LAB
ALBUMIN SERPL BCG-MCNC: 3.5 G/DL (ref 3.2–5.2)
ALP SERPL-CCNC: 48 U/L (ref 46–116)
ALT SERPL W P-5'-P-CCNC: 26 U/L (ref 7–40)
APPEARANCE UR: (no result)
APTT BLD: 28.5 SECONDS (ref 24.8–34.2)
AST SERPL-CCNC: 10 U/L (ref ?–34)
BACTERIA UR QL AUTO: (no result)
BASOPHILS # BLD AUTO: 0.1 10^3/UL (ref 0–0.2)
BASOPHILS NFR BLD AUTO: 0.9 % (ref 0–1)
BILIRUB CONJ SERPL-MCNC: < 0.1 MG/DL (ref ?–0.4)
BILIRUB SERPL-MCNC: 0.2 MG/DL (ref 0.3–1.2)
BILIRUB UR QL STRIP.AUTO: NEGATIVE
BUN SERPL-MCNC: 14 MG/DL (ref 9–23)
CALCIUM SERPL-MCNC: 8.9 MG/DL (ref 8.5–10.1)
CHLORIDE SERPL-SCNC: 108 MMOL/L (ref 98–107)
CK MB CFR.DF SERPL CALC: 1.11
CK MB CFR.DF SERPL CALC: 1.28
CK MB SERPL-MCNC: < 1 NG/ML (ref ?–3.6)
CK MB SERPL-MCNC: < 1 NG/ML (ref ?–3.6)
CK SERPL-CCNC: 78 U/L (ref 34–145)
CK SERPL-CCNC: 90 U/L (ref 34–145)
CO2 SERPL-SCNC: 25 MMOL/L (ref 20–31)
CREAT SERPL-MCNC: 0.95 MG/DL (ref 0.55–1.3)
EOSINOPHIL # BLD AUTO: 0.1 10^3/UL (ref 0–0.5)
EOSINOPHIL NFR BLD AUTO: 2.1 % (ref 0–3)
GFR SERPL CREATININE-BSD FRML MDRD: > 60 ML/MIN/{1.73_M2} (ref 60–?)
GLUCOSE SERPL-MCNC: 82 MG/DL (ref 60–100)
GLUCOSE UR QL STRIP.AUTO: NEGATIVE MG/DL
HCT VFR BLD AUTO: 38.7 % (ref 36–47)
HGB BLD-MCNC: 12.1 G/DL (ref 12–15.5)
HGB UR QL STRIP.AUTO: (no result)
INR PPP: 1.16
KETONES UR QL STRIP.AUTO: NEGATIVE MG/DL
LEUKOCYTE ESTERASE UR QL STRIP.AUTO: (no result)
LYMPHOCYTES # BLD AUTO: 1.9 10^3/UL (ref 1.5–5)
LYMPHOCYTES NFR BLD AUTO: 28.4 % (ref 24–44)
MCH RBC QN AUTO: 25.3 PG (ref 27–33)
MCHC RBC AUTO-ENTMCNC: 31.3 G/DL (ref 32–36.5)
MCV RBC AUTO: 81 FL (ref 80–96)
MONOCYTES # BLD AUTO: 0.6 10^3/UL (ref 0–0.8)
MONOCYTES NFR BLD AUTO: 8.8 % (ref 2–8)
MUCOUS THREADS URNS QL MICRO: (no result)
NEUTROPHILS # BLD AUTO: 4.1 10^3/UL (ref 1.5–8.5)
NEUTROPHILS NFR BLD AUTO: 59.7 % (ref 36–66)
NITRITE UR QL STRIP.AUTO: NEGATIVE
PH UR STRIP.AUTO: 6 UNITS (ref 5–9)
PLATELET # BLD AUTO: 376 10^3/UL (ref 150–450)
POTASSIUM SERPL-SCNC: 4.1 MMOL/L (ref 3.5–5.1)
PROT SERPL-MCNC: 7.7 G/DL (ref 5.7–8.2)
PROT UR QL STRIP.AUTO: NEGATIVE MG/DL
PROTHROMBIN TIME: 14.4 SECONDS (ref 12.5–14.5)
RBC # BLD AUTO: 4.78 10^6/UL (ref 4–5.4)
RBC # UR AUTO: 56 /HPF (ref 0–3)
SODIUM SERPL-SCNC: 138 MMOL/L (ref 136–145)
SP GR UR STRIP.AUTO: 1.01 (ref 1–1.03)
SQUAMOUS #/AREA URNS AUTO: 2 /HPF (ref 0–6)
T4 FREE SERPL-MCNC: 1.06 NG/DL (ref 0.89–1.76)
TSH SERPL DL<=0.005 MIU/L-ACNC: 3.12 UIU/ML (ref 0.55–4.78)
UROBILINOGEN UR QL STRIP.AUTO: 0.2 MG/DL (ref 0–2)
WBC # BLD AUTO: 6.8 10^3/UL (ref 4–10)
WBC #/AREA URNS AUTO: 10 /HPF (ref 0–3)

## 2024-01-30 PROCEDURE — 93005 ELECTROCARDIOGRAM TRACING: CPT

## 2024-01-30 PROCEDURE — 82550 ASSAY OF CK (CPK): CPT

## 2024-01-30 PROCEDURE — 83880 ASSAY OF NATRIURETIC PEPTIDE: CPT

## 2024-01-30 PROCEDURE — 80053 COMPREHEN METABOLIC PANEL: CPT

## 2024-01-30 PROCEDURE — 93041 RHYTHM ECG TRACING: CPT

## 2024-01-30 PROCEDURE — 84443 ASSAY THYROID STIM HORMONE: CPT

## 2024-01-30 PROCEDURE — 94760 N-INVAS EAR/PLS OXIMETRY 1: CPT

## 2024-01-30 PROCEDURE — 85610 PROTHROMBIN TIME: CPT

## 2024-01-30 PROCEDURE — 82553 CREATINE MB FRACTION: CPT

## 2024-01-30 PROCEDURE — 82248 BILIRUBIN DIRECT: CPT

## 2024-01-30 PROCEDURE — 85025 COMPLETE CBC W/AUTO DIFF WBC: CPT

## 2024-01-30 PROCEDURE — 71045 X-RAY EXAM CHEST 1 VIEW: CPT

## 2024-01-30 PROCEDURE — 84439 ASSAY OF FREE THYROXINE: CPT

## 2024-01-30 PROCEDURE — 81001 URINALYSIS AUTO W/SCOPE: CPT

## 2024-01-30 PROCEDURE — 99284 EMERGENCY DEPT VISIT MOD MDM: CPT

## 2024-01-30 PROCEDURE — 85730 THROMBOPLASTIN TIME PARTIAL: CPT

## 2024-01-30 PROCEDURE — 71275 CT ANGIOGRAPHY CHEST: CPT

## 2024-01-30 PROCEDURE — 36415 COLL VENOUS BLD VENIPUNCTURE: CPT
